# Patient Record
Sex: MALE | Race: BLACK OR AFRICAN AMERICAN | NOT HISPANIC OR LATINO | URBAN - NONMETROPOLITAN AREA
[De-identification: names, ages, dates, MRNs, and addresses within clinical notes are randomized per-mention and may not be internally consistent; named-entity substitution may affect disease eponyms.]

---

## 2024-04-18 ENCOUNTER — TELEPHONE (OUTPATIENT)
Dept: CARDIOLOGY | Facility: CLINIC | Age: 59
End: 2024-04-18

## 2024-04-18 NOTE — TELEPHONE ENCOUNTER
Phoned [facility] who is requesting the surgery clearance. Spoke with Marianne in the nursing department. She will reach out to patient to determine who patient is following with and get clearance to them.

## 2024-04-18 NOTE — TELEPHONE ENCOUNTER
----- Message from Aubrey Christensen DO sent at 4/18/2024 12:56 PM EDT -----  Regarding: POC  Patient is never seen [facility] or myself cannot comment  ----- Message -----  From: Anjelica Mac  Sent: 4/18/2024  11:08 AM EDT  To: Aubrey Christensen DO